# Patient Record
Sex: FEMALE | Race: WHITE | NOT HISPANIC OR LATINO | Employment: UNEMPLOYED | URBAN - METROPOLITAN AREA
[De-identification: names, ages, dates, MRNs, and addresses within clinical notes are randomized per-mention and may not be internally consistent; named-entity substitution may affect disease eponyms.]

---

## 2023-11-02 PROBLEM — E10.649 HYPOGLYCEMIA UNAWARENESS IN TYPE 1 DIABETES MELLITUS (MULTI): Status: ACTIVE | Noted: 2023-11-02

## 2023-11-02 PROBLEM — N92.6 IRREGULAR PERIODS: Status: ACTIVE | Noted: 2023-11-02

## 2023-11-02 PROBLEM — D49.6: Status: ACTIVE | Noted: 2023-11-02

## 2023-11-02 PROBLEM — N91.0 PRIMARY AMENORRHEA: Status: ACTIVE | Noted: 2023-11-02

## 2023-11-02 PROBLEM — R56.9 SEIZURES (MULTI): Status: ACTIVE | Noted: 2023-11-02

## 2023-11-02 PROBLEM — R45.89 FEELING DOWN: Status: ACTIVE | Noted: 2023-11-02

## 2023-11-02 PROBLEM — G91.1 OBSTRUCTIVE HYDROCEPHALUS (MULTI): Status: ACTIVE | Noted: 2023-11-02

## 2023-11-02 PROBLEM — F43.10 COMPLEX POSTTRAUMATIC STRESS DISORDER: Status: ACTIVE | Noted: 2023-11-02

## 2023-11-02 PROBLEM — G93.9 BRAINSTEM LESION: Status: ACTIVE | Noted: 2023-11-02

## 2023-11-02 PROBLEM — S06.5XAA SUBDURAL HEMATOMA (MULTI): Status: ACTIVE | Noted: 2023-11-02

## 2023-11-02 PROBLEM — Z98.2 PRESENCE OF CEREBROSPINAL FLUID DRAINAGE DEVICE: Status: ACTIVE | Noted: 2023-11-02

## 2023-11-02 PROBLEM — R45.851 SUICIDAL IDEATIONS: Status: ACTIVE | Noted: 2023-11-02

## 2023-11-02 PROBLEM — G93.0 CEREBRAL CYST: Status: ACTIVE | Noted: 2023-11-02

## 2023-11-02 PROBLEM — F33.9 MAJOR DEPRESSIVE DISORDER, RECURRENT (CMS-HCC): Status: ACTIVE | Noted: 2023-11-02

## 2023-11-02 RX ORDER — BLOOD SUGAR DIAGNOSTIC
STRIP MISCELLANEOUS
COMMUNITY
Start: 2021-05-28

## 2023-11-02 RX ORDER — INSULIN LISPRO 100 [IU]/ML
100 INJECTION, SOLUTION INTRAVENOUS; SUBCUTANEOUS
COMMUNITY
Start: 2021-06-21 | End: 2023-11-03 | Stop reason: SDUPTHER

## 2023-11-02 RX ORDER — LAMOTRIGINE 25 MG/1
25 TABLET ORAL 3 TIMES DAILY
COMMUNITY

## 2023-11-02 RX ORDER — LAMOTRIGINE 100 MG/1
1 TABLET ORAL 2 TIMES DAILY
COMMUNITY
Start: 2022-02-01

## 2023-11-02 RX ORDER — ARIPIPRAZOLE 10 MG/1
10 TABLET ORAL
COMMUNITY

## 2023-11-02 RX ORDER — CLONAZEPAM 1 MG/1
1 TABLET, ORALLY DISINTEGRATING ORAL AS NEEDED
COMMUNITY
Start: 2021-04-07

## 2023-11-02 RX ORDER — NORGESTIMATE AND ETHINYL ESTRADIOL 0.25-0.035
1 KIT ORAL DAILY
COMMUNITY
Start: 2022-05-17 | End: 2023-11-03 | Stop reason: WASHOUT

## 2023-11-02 RX ORDER — GLUCAGON 3 MG/1
POWDER NASAL
COMMUNITY
Start: 2020-11-13

## 2023-11-02 RX ORDER — INSULIN GLARGINE 100 [IU]/ML
26 INJECTION, SOLUTION SUBCUTANEOUS
COMMUNITY
Start: 2020-10-15

## 2023-11-03 ENCOUNTER — LAB (OUTPATIENT)
Dept: LAB | Facility: LAB | Age: 18
End: 2023-11-03
Payer: MEDICAID

## 2023-11-03 ENCOUNTER — OFFICE VISIT (OUTPATIENT)
Dept: PEDIATRIC ENDOCRINOLOGY | Facility: CLINIC | Age: 18
End: 2023-11-03
Payer: MEDICAID

## 2023-11-03 ENCOUNTER — NUTRITION (OUTPATIENT)
Dept: PEDIATRIC ENDOCRINOLOGY | Facility: CLINIC | Age: 18
End: 2023-11-03

## 2023-11-03 VITALS
BODY MASS INDEX: 28.3 KG/M2 | HEIGHT: 67 IN | HEART RATE: 92 BPM | SYSTOLIC BLOOD PRESSURE: 131 MMHG | RESPIRATION RATE: 17 BRPM | DIASTOLIC BLOOD PRESSURE: 88 MMHG | WEIGHT: 180.34 LBS

## 2023-11-03 DIAGNOSIS — E10.9 TYPE 1 DIABETES, HBA1C GOAL < 7% (MULTI): ICD-10-CM

## 2023-11-03 DIAGNOSIS — E10.9 TYPE 1 DIABETES, HBA1C GOAL < 7% (MULTI): Primary | ICD-10-CM

## 2023-11-03 LAB — HCG UR QL IA.RAPID: NEGATIVE

## 2023-11-03 PROCEDURE — 99215 OFFICE O/P EST HI 40 MIN: CPT | Performed by: PEDIATRICS

## 2023-11-03 PROCEDURE — 3079F DIAST BP 80-89 MM HG: CPT | Performed by: PEDIATRICS

## 2023-11-03 PROCEDURE — 95251 CONT GLUC MNTR ANALYSIS I&R: CPT | Performed by: PEDIATRICS

## 2023-11-03 PROCEDURE — 3075F SYST BP GE 130 - 139MM HG: CPT | Performed by: PEDIATRICS

## 2023-11-03 PROCEDURE — 81025 URINE PREGNANCY TEST: CPT

## 2023-11-03 RX ORDER — INSULIN LISPRO 100 [IU]/ML
INJECTION, SOLUTION INTRAVENOUS; SUBCUTANEOUS
Qty: 30 ML | Refills: 11 | Status: SHIPPED | OUTPATIENT
Start: 2023-11-03

## 2023-11-03 RX ORDER — NORGESTIMATE AND ETHINYL ESTRADIOL 7DAYSX3 28
1 KIT ORAL DAILY
Qty: 28 TABLET | Refills: 12 | Status: SHIPPED | OUTPATIENT
Start: 2023-11-03 | End: 2024-02-09 | Stop reason: SDUPTHER

## 2023-11-03 ASSESSMENT — PAIN SCALES - GENERAL: PAINLEVEL: 0-NO PAIN

## 2023-11-03 NOTE — PROGRESS NOTES
"Reason for Nutrition Visit:  Pt is a 18 y.o. female being seen for T1DM.     Past Medical Hx:  Patient Active Problem List   Diagnosis    Brainstem lesion    Brainstem neoplasm (CMS/HCC)    Cerebral cyst    Complex posttraumatic stress disorder    Feeling down    Hypoglycemia unawareness in type 1 diabetes mellitus (CMS/HCC)    Irregular periods    Major depressive disorder, recurrent (CMS/HCC)    Obstructive hydrocephalus (CMS/HCC)    Presence of cerebrospinal fluid drainage device    Primary amenorrhea    Seizures (CMS/HCC)    Subdural hematoma (CMS/HCC)    Suicidal ideations        Anthropometrics:         11/3/2023    12:59 PM   Vitals   Systolic 131   Diastolic 88   Heart Rate 92   Resp 17   Height (in) 1.706 m (5' 7.17\")   Weight (lb) 180.34   BMI 28.11 kg/m2   BSA (m2) 1.97 m2      Lab Results   Component Value Date    HGBA1C 10.1 (A) 05/13/2022    CHOL 152 05/13/2022      Results for orders placed or performed in visit on 05/13/22   Human Chorionic Gonadotropin   Result Value Ref Range    hCG Quantitative <3 IU/L   Comprehensive Metabolic Panel   Result Value Ref Range    Glucose 384 (H) 74 - 99 mg/dL    Sodium 136 136 - 145 mmol/L    Potassium 4.4 3.5 - 5.3 mmol/L    Chloride 100 98 - 107 mmol/L    Bicarbonate 26 21 - 32 mmol/L    Anion Gap 14 10 - 20 mmol/L    Urea Nitrogen 10 6 - 23 mg/dL    Creatinine 0.67 0.50 - 1.05 mg/dL    GFR Female >90 >90 mL/min/1.73m2    Calcium 9.7 8.6 - 10.6 mg/dL    Albumin 4.4 3.4 - 5.0 g/dL    Alkaline Phosphatase 126 (H) 33 - 110 U/L    Total Protein 6.9 6.4 - 8.2 g/dL    AST 7 (L) 9 - 39 U/L    Total Bilirubin 0.3 0.0 - 1.2 mg/dL    ALT (SGPT) 9 7 - 45 U/L   Tissue Transglutaminase IgA   Result Value Ref Range    Tissue Transglutaminase, IgA <1 0 - 14 U/mL   Thyroid Stimulating Hormone   Result Value Ref Range    TSH 0.62 0.44 - 3.98 mIU/L   Albumin , Urine Random   Result Value Ref Range    ALBUMIN (MG/L) IN URINE 10.1 Not Established mg/L    Albumin/Creatine Ratio 38.8 " (H) 0.0 - 30.0 ug/mg crt    Creatinine, Urine 26.0 20.0 - 320.0 mg/dL   Lipid Panel Non-Fasting   Result Value Ref Range    Cholesterol 152 0 - 199 mg/dL    HDL 54.0 mg/dL    Cholesterol/HDL Ratio 2.8     Non-HDL Cholesterol 98 0 - 119 mg/dL   Hemoglobin A1C   Result Value Ref Range    Hemoglobin A1C 10.1 (A) %    Estimated Average Glucose 243 MG/DL   Thyroxine, Free   Result Value Ref Range    Free T4 1.09 0.78 - 1.48 ng/dL       Insulin Instructions  Omnipod 5   insulin lispro 100 unit/mL injection (HumaLOG)   Last edited by Shwetha Escalante RN on 11/2/2023 at 10:22 AM      Active insulin time = 2 hours      Basal Rate   Total Basal Dose: 36.8 units/day   Time units/hr   12:00 AM 1.7    8:00 AM 1.45    9:00 PM 1.45      Blood Glucose Target   Time mg/dL   12:00  - 120    8:00  - 120    9:00  - 120      Sensitivity Factor   Time mg/dL/unit   12:00 AM 25      Carb Ratio   Time g/unit   12:00 AM 4       Medications:   Current Outpatient Medications on File Prior to Visit   Medication Sig Dispense Refill    acetone, urine, test (TRUEplus Ketone) strip test urine for ketones if blood sugar >250, with illness, or if insulin dose missed      ARIPiprazole (Abilify) 10 mg tablet Take 1 tablet (10 mg) by mouth.      clonazePAM (KlonoPIN) 1 mg disintegrating tablet Take 1 tablet (1 mg) by mouth if needed for seizures.  TAKE 1 TABLET Other PRN After seizure > than 5 minutes.      glucagon (Baqsimi) 3 mg/actuation spray,non-aerosol Administer into affected nostril(s).      insulin glargine (Lantus Solostar U-100 Insulin) 100 unit/mL (3 mL) pen Inject 26 Units under the skin.  inject 26 units subcutaneoulsy daily as needed for pump failure      insulin lispro (HumaLOG U-100 Insulin) 100 unit/mL injection 100 units daily  per insulin pump 30 mL 11    lamoTRIgine (LaMICtal) 100 mg tablet Take 1 tablet (100 mg) by mouth 2 times a day.      lamoTRIgine (LaMICtal) 25 mg tablet Take 1 tablet (25 mg) by mouth 3  times a day.      norgestimate-ethinyl estradioL (Sprintec, 28,) 0.25-35 mg-mcg tablet Take 1 tablet by mouth once daily.      [DISCONTINUED] insulin lispro (HumaLOG U-100 Insulin) 100 unit/mL injection Inject 1 mL (100 Units) under the skin.       No current facility-administered medications on file prior to visit.      24 Diet Recall:  Meal 2: 1 - ice coffee (74) // sausage pepper + onion + rice (1/2 c)(42)// cheeseburger (25) + water + diet Coke   Snacks:  meat + cheese OR SF Cool Whip frozen (40)    Snacks: puffcorn OR Ramen noodles   Loves fruits and vegetables // likes salad   Hanging out at the house // eats what mom cooks// Cherrie cooks   Stairs up and down in the house     Estimated Energy Needs:  9772-6619 calories per day     Nutrition Diagnosis:    Diagnosis Statement 1:  Diagnosis Status: Ongoing  Diagnosis : Food and nutrition related knowledge deficit related to lacks motivation and/or readiness to apply or support systems change as evidenced by erratic foods and     Nutrition Goals:  Try to eat 3 times per day.  Encouraged eating more fruits and vegetables.  Cover all CHO with insulin.  Will follow.

## 2023-11-03 NOTE — PROGRESS NOTES
"Subjective   Cherrie Moses is a 18 y.o. female with type 1 diabetes.        HPI     Goals         in target a1c (pt-stated)       Pre meal bolus  Try to limit carbs              Concerns at this visit:  denies concerns today, feels glucoses are running high.    Social: states no drugs > 1 year    Screens:  Eye exam: 2022  Labs: 5/2022 due  Flu shot: received 11/3/2023    Insulin Injections/Pump sites:  - Gives mealtime insulin during eating  - Site rotation: lower back, thinks she should rotate more        Other:  Hypoglyemia:  - uses  candy, nerds to treat lows  - treats with  15-20 gms carbs  - Nocturnal hypoglycemia? none  Checks ketones with:    Exercise: minimal        Date of Diabetes Diagnosis: 05/01/16  CGM Type: Dexcom G6  Time in range 70-180mg/dL (%): 57  Time low <70mg/dL (%): 1  ED/Hospitalizations related to Diabetes: No  ED/Hospitalization not related to Diabetes: No  ED/Hospitalization related to DKA: No  Severe Hypoglycemia (coma, seizure, disorientation, or the need for high dose glucagon) since last visit: NoInsulin Instructions  Omnipod 5   insulin lispro 100 unit/mL injection (HumaLOG)   Last edited by Shwetha Escalante RN on 11/2/2023 at 10:22 AM      Active insulin time = 2 hours      Basal Rate   Total Basal Dose: 36.8 units/day   Time units/hr   12:00 AM 1.7    8:00 AM 1.45    9:00 PM 1.45      Blood Glucose Target   Time mg/dL   12:00  - 120    8:00  - 120    9:00  - 120      Sensitivity Factor   Time mg/dL/unit   12:00 AM 25      Carb Ratio   Time g/unit   12:00 AM 4             Review of Systems  Regular periods, interested in birth control    Objective   /88   Pulse 92   Resp 17   Ht 1.706 m (5' 7.17\")   Wt 81.8 kg (180 lb 5.4 oz)   BMI 28.11 kg/m²      Lab  Hemoglobin A1C   Date Value Ref Range Status   05/13/2022 10.1 (A) % Final     Comment:          Diagnosis of Diabetes-Adults   Non-Diabetic: < or = 5.6%   Increased risk for developing diabetes: " 5.7-6.4%   Diagnostic of diabetes: > or = 6.5%  .       Monitoring of Diabetes                Age (y)     Therapeutic Goal (%)   Adults:          >18           <7.0   Pediatrics:    13-18           <7.5                   7-12           <8.0                   0- 6            7.5-8.5   American Diabetes Association. Diabetes Care 33(S1), Jan 2010.         Physical Exam   General: interactive in NAD  Injection/pump/sensor sites: no hypertrophies, no bruising or signs of infection or allergic reaction  Fundi:   Neck: No lymphadenopathy  Heart: no edema or cyanosis  Chest/Lungs: unlabored breathing   Abdomen: Soft, non-tender  Neuro: Grossly Intact  Extremities: normal  Feet: normal   Thyroid: normal       Enlargement: not enlarged       Consistency: soft       Surface: smooth  Sexual Development: mature      Assessment/Plan   A 18 y.o. female with T1 Diabetes since 2016 treated  currently with Omnipod 5. She also has a shitory of hydrocephalus s/p  shunt and major behavioral concern and prescribed antipsychotics.  A1C is above target and has trended down since last.   Challenges include: weight gain with antipsychotics with resulting insulin resistance, sedentary lifestyle.  BP is normal.   Insulin pump / sensor/ meter reports were reviewed for patterns and no insulin dose adjustments were made (see insulin instructions).     Patient is due for annual surveillance tests which were ordered.  Patient needs to schedule an eye exam.    Topics reviewed:  - importance of prebolusing   - benefits of physical activity using activity/exercise mode   - family support and collaboration  - family planning -> prescribed sprintec (no fam Hx of 1st degree breast cancer or migraines with aura)    Follow up in 3 mos         Problem List Items Addressed This Visit    None  Visit Diagnoses       Type 1 diabetes, HbA1c goal < 7% (CMS/HCC)    -  Primary    Relevant Medications    insulin lispro (HumaLOG U-100 Insulin) 100 unit/mL  injection    norgestimate-ethinyl estradioL (Ortho Tri-Cyclen,Trinessa) 0.18/0.215/0.25 mg-35 mcg (28) tablet    Other Relevant Orders    POCT glycosylated hemoglobin (Hb A1C) manually resulted    Lipid Panel    Albumin , Urine Random    TSH with reflex to Free T4 if abnormal    Comprehensive Metabolic Panel    hCG, Urine, Qualitative            Plan  No setting changes, but work on pre meal bolusing and bolusing for all carbs  Annual labs ordered-- have them done fasting:  only water for 10 hours before blood draw.  Make an eye appointment     Insulin Instructions  Omnipod 5   insulin lispro 100 unit/mL injection (HumaLOG)   Last edited by Shwetha Escalante RN on 11/2/2023 at 10:22 AM      Active insulin time = 2 hours      Basal Rate   Total Basal Dose: 36.8 units/day   Time units/hr   12:00 AM 1.7    8:00 AM 1.45    9:00 PM 1.45      Blood Glucose Target   Time mg/dL   12:00  - 120    8:00  - 120    9:00  - 120      Sensitivity Factor   Time mg/dL/unit   12:00 AM 25      Carb Ratio   Time g/unit   12:00 AM 4       CGM Interpretation  CGM report was reviewed with family, download scanned into EMR see above for statistics. There is pattern of postprandial hyperglycemia if a bolus is missed      CGM Plan  - no changes, discussed a need to bolus for all meals and snacks

## 2023-11-03 NOTE — PATIENT INSTRUCTIONS
It was good to see you today!  No setting changes, but work on pre meal bolusing and bolusing for all carbs  Annual labs ordered-- have them done fasting:  only water for 10 hours before blood draw.  Make an eye appointment

## 2023-11-10 ENCOUNTER — APPOINTMENT (OUTPATIENT)
Dept: PEDIATRIC ENDOCRINOLOGY | Facility: CLINIC | Age: 18
End: 2023-11-10

## 2024-02-09 ENCOUNTER — OFFICE VISIT (OUTPATIENT)
Dept: PEDIATRIC ENDOCRINOLOGY | Facility: CLINIC | Age: 19
End: 2024-02-09
Payer: MEDICAID

## 2024-02-09 ENCOUNTER — LAB (OUTPATIENT)
Dept: LAB | Facility: LAB | Age: 19
End: 2024-02-09
Payer: COMMERCIAL

## 2024-02-09 VITALS
DIASTOLIC BLOOD PRESSURE: 84 MMHG | SYSTOLIC BLOOD PRESSURE: 134 MMHG | HEIGHT: 72 IN | HEART RATE: 96 BPM | BODY MASS INDEX: 24.25 KG/M2 | OXYGEN SATURATION: 97 % | WEIGHT: 179.01 LBS

## 2024-02-09 DIAGNOSIS — E10.9 TYPE 1 DIABETES MELLITUS WITHOUT COMPLICATION (MULTI): Primary | ICD-10-CM

## 2024-02-09 DIAGNOSIS — E10.9 TYPE 1 DIABETES, HBA1C GOAL < 7% (MULTI): ICD-10-CM

## 2024-02-09 DIAGNOSIS — E10.9 TYPE 1 DIABETES MELLITUS WITHOUT COMPLICATION (MULTI): ICD-10-CM

## 2024-02-09 DIAGNOSIS — Z78.9 USES BIRTH CONTROL: ICD-10-CM

## 2024-02-09 LAB — POC HEMOGLOBIN A1C: 9.7 % (ref 4.2–6.5)

## 2024-02-09 PROCEDURE — 3079F DIAST BP 80-89 MM HG: CPT | Performed by: PEDIATRICS

## 2024-02-09 PROCEDURE — 83036 HEMOGLOBIN GLYCOSYLATED A1C: CPT | Mod: MUE | Performed by: PEDIATRICS

## 2024-02-09 PROCEDURE — 95251 CONT GLUC MNTR ANALYSIS I&R: CPT | Performed by: PEDIATRICS

## 2024-02-09 PROCEDURE — 99214 OFFICE O/P EST MOD 30 MIN: CPT | Performed by: PEDIATRICS

## 2024-02-09 PROCEDURE — 3075F SYST BP GE 130 - 139MM HG: CPT | Performed by: PEDIATRICS

## 2024-02-09 RX ORDER — NORGESTIMATE AND ETHINYL ESTRADIOL 0.25-0.035
1 KIT ORAL DAILY
Qty: 28 TABLET | Refills: 12 | Status: SHIPPED | OUTPATIENT
Start: 2024-02-09 | End: 2025-02-08

## 2024-02-09 ASSESSMENT — PATIENT HEALTH QUESTIONNAIRE - PHQ9: 2. FEELING DOWN, DEPRESSED OR HOPELESS: NOT AT ALL

## 2024-02-09 ASSESSMENT — ENCOUNTER SYMPTOMS
OCCASIONAL FEELINGS OF UNSTEADINESS: 0
DEPRESSION: 0
LOSS OF SENSATION IN FEET: 0

## 2024-02-09 ASSESSMENT — PAIN SCALES - GENERAL: PAINLEVEL: 0-NO PAIN

## 2024-02-09 NOTE — PATIENT INSTRUCTIONS
It was good to see you today! You HBA1C is 9.7% today.      No setting changes, but work on pre meal bolusing and bolusing for all carbs  Annual labs ordered  Make an eye appointment.    Contact Information for Pediatric Endocrinology:   Daytime Number: [phone]  Night/Weekend (emergencies): [phone]  Email: [email or web address]    Please do not send my-chart messages for urgent issues

## 2024-02-09 NOTE — PROGRESS NOTES
Subjective   Cherrie Moses is a 18 y.o. female with type 1 diabetes, here for follow-up with boyfriend [place].     Last visit: 11/3/2023  Complications: no   Comorbidities: hydrocephalus (after shunt)     DIABETES Hx:    Date of Diabetes Diagnosis: 05/01/16  CGM Type: Dexcom G6  Time in range 70-180mg/dL (%): 53  Time low <70mg/dL (%): 0  ED/Hospitalizations related to Diabetes: No  ED/Hospitalization not related to Diabetes: No  ED/Hospitalization related to DKA: No  Severe Hypoglycemia (coma, seizure, disorientation, or the need for high dose glucagon) since last visit: NO    INTERVAL Hx:     She is doing well. There is no problem with her pump or refill. There was a time when the CGM can not be connected. But she had it sorted it out 2 days later.   She is not in school now. She is waiting for the approval for [employer] program.   Her daily schedule as below:  Sleep around 1:30 am   Wake up around 10-11am   Dinner around 5:50 -6:00 pm  Snacking around 9-11:00 pm  Bolus when snacking  Denied Symptom of polyuria or polydipsia. Denied symptom of heat/cold intolerance, palpitation, or excessive sweating. Denied Diarrhea or constipation. Denied excessive tiredness, edema,dry skin or hair fall.  Denied any headache. She is sexually active, taking birth control pill now, said she doesn't like it because she is spotting, she does not want to be pregnant, agreed to try a different OCP.       Her HBA1C IS 9.7% today (Last HBA1C is 8.1 in Nov 2023)  SCREENS/LABS:May/2022 (she did not do the annual tests asked for in last visit)  Eye exam:  can not remember     CURRENT DIABETES REGIMEN:  Insulin Instructions  Omnipod 5   insulin lispro 100 unit/mL injection (HumaLOG)   Last edited by Shwetha Escalante RN on 11/2/2023 at 10:22 AM      Active insulin time = 2 hours      Basal Rate   Total Basal Dose: 36.8 units/day   Time units/hr   12:00 AM 1.7    8:00 AM 1.45    9:00 PM 1.45      Blood Glucose Target   Time mg/dL   12:00 AM  "110 - 120    8:00  - 120    9:00  - 120      Sensitivity Factor   Time mg/dL/unit   12:00 AM 25      Carb Ratio   Time g/unit   12:00 AM 4     Timing of boluses: 3.1  Sites: back wrist and thighs    Average glucose: 185 mg/dl  Time in range 70-180mg/dL (%): 53  Hypoglycemia awareness: no  ROS otherwise negative    Objective   /84   Pulse 96   Ht 1.896 m (6' 2.65\")   Wt 81.2 kg (179 lb 0.2 oz)   SpO2 97%   BMI 22.59 kg/m²          Hemoglobin A1C   Date Value Ref Range Status   05/13/2022 10.1 (A) % Final   03/16/2022 14.1 (A) % Final   11/01/2020 8.5 % Final     Lab Results   Component Value Date    MICROALBCREA 38.8 (H) 05/13/2022    MICROALBCREA 146.6 (H) 01/14/2022    CREATININE 0.67 05/13/2022    CREATININE 0.52 03/17/2022    CREATININE 0.44 (L) 03/16/2022    TSH 0.62 05/13/2022    TSH 1.37 11/08/2021    TSH 0.83 09/28/2020    TTGA <1 05/13/2022       Physical Exam:  alert and conversant, in no acute distress  sclera anicteric, no lid lag, no proptosis  mmm  thyroid normal no goiter  normal work of breathing  normal mood/affect  No acanthosis;  No lipohypertrophy    Assessment/Plan   Diagnoses and all orders for this visit:  Type 1 diabetes mellitus without complication (CMS/[facility])  Hydrocephalus    A 18 y.o. female with T1 Diabetes since 2016 treated currently with Omnipod 5. She also has a history of hydrocephalus s/p  shunt and major behavioral concern and prescribed antipsychotics.  A1C elevated from 8.1% to 9.7% since last visit.   Patient is due for annual surveillance tests which were ordered.  Patient needs to schedule an eye exam.    CGM interpret    Blood sugar is above her target range some of the time over night, with high reading at night before sleep after food. patient said it was due to late bedtime food. Talked to the patient and she admitted that she normally ate take away 2 times a weeks and may under bolus the carb at times. Recommend her to give extra bolus for oily " take away food.               Plan:  1, Do the annual lab.  -     Albumin , Urine Random;   -     Comprehensive Metabolic Panel;   -     Lipid Panel Non-Fasting;   -     TSH with reflex to Free T4 if abnormal;   2, Ophthalmologist appointment to check eye.  3. Changed OCP from ortho tri cyclen to ortho cyclen  3, Continue the same setting of the pump as below:  4, Follow up in 3 months.      Insulin Instructions  Omnipod 5   insulin lispro 100 unit/mL injection (HumaLOG)   Last edited by Shwetha Escalante RN on 11/2/2023 at 10:22 AM      Active insulin time = 2 hours      Basal Rate   Total Basal Dose: 36.8 units/day   Time units/hr   12:00 AM 1.7    8:00 AM 1.45    9:00 PM 1.45      Blood Glucose Target   Time mg/dL   12:00  - 120    8:00  - 120    9:00  - 120      Sensitivity Factor   Time mg/dL/unit   12:00 AM 25      Carb Ratio   Time g/unit   12:00 AM 4       Patient was seen, re-examined and discussed with attending Dr. Soco ARRIAGA MD.  Pediatric Endocrinology Fellow

## 2024-06-28 ENCOUNTER — NUTRITION (OUTPATIENT)
Dept: PEDIATRIC ENDOCRINOLOGY | Facility: CLINIC | Age: 19
End: 2024-06-28

## 2024-06-28 ENCOUNTER — APPOINTMENT (OUTPATIENT)
Dept: PEDIATRIC ENDOCRINOLOGY | Facility: CLINIC | Age: 19
End: 2024-06-28
Payer: COMMERCIAL

## 2024-06-28 VITALS
HEIGHT: 67 IN | HEART RATE: 101 BPM | SYSTOLIC BLOOD PRESSURE: 130 MMHG | WEIGHT: 175.93 LBS | DIASTOLIC BLOOD PRESSURE: 78 MMHG | BODY MASS INDEX: 27.61 KG/M2

## 2024-06-28 DIAGNOSIS — E10.9 TYPE 1 DIABETES, HBA1C GOAL < 7% (MULTI): ICD-10-CM

## 2024-06-28 LAB — POC HEMOGLOBIN A1C: 7.6 % (ref 4.2–6.5)

## 2024-06-28 PROCEDURE — 83036 HEMOGLOBIN GLYCOSYLATED A1C: CPT | Mod: QW | Performed by: PEDIATRICS

## 2024-06-28 PROCEDURE — 95251 CONT GLUC MNTR ANALYSIS I&R: CPT | Performed by: PEDIATRICS

## 2024-06-28 PROCEDURE — 99215 OFFICE O/P EST HI 40 MIN: CPT | Performed by: PEDIATRICS

## 2024-06-28 PROCEDURE — 3075F SYST BP GE 130 - 139MM HG: CPT | Performed by: PEDIATRICS

## 2024-06-28 PROCEDURE — 3078F DIAST BP <80 MM HG: CPT | Performed by: PEDIATRICS

## 2024-06-28 RX ORDER — GLUCAGON 3 MG/1
POWDER NASAL
Qty: 2 EACH | Refills: 2 | Status: SHIPPED | OUTPATIENT
Start: 2024-06-28

## 2024-06-28 RX ORDER — INSULIN LISPRO 100 [IU]/ML
INJECTION, SOLUTION INTRAVENOUS; SUBCUTANEOUS
Qty: 30 ML | Refills: 11 | Status: SHIPPED | OUTPATIENT
Start: 2024-06-28

## 2024-06-28 RX ORDER — BLOOD-GLUCOSE TRANSMITTER
EACH MISCELLANEOUS
Qty: 1 EACH | Refills: 3 | Status: SHIPPED | OUTPATIENT
Start: 2024-06-28

## 2024-06-28 RX ORDER — INSULIN PMP CART,AUT,G6/7,CNTR
1 EACH SUBCUTANEOUS EVERY OTHER DAY
Qty: 15 EACH | Refills: 11 | Status: SHIPPED | OUTPATIENT
Start: 2024-06-28 | End: 2025-06-28

## 2024-06-28 RX ORDER — BLOOD-GLUCOSE SENSOR
EACH MISCELLANEOUS
Qty: 3 EACH | Refills: 11 | Status: SHIPPED | OUTPATIENT
Start: 2024-06-28

## 2024-06-28 ASSESSMENT — PAIN SCALES - GENERAL: PAINLEVEL: 0-NO PAIN

## 2024-06-28 NOTE — PROGRESS NOTES
"Reason for Nutrition Visit:  Pt is a 19 y.o. female being seen for T1DM     Past Medical Hx:  Patient Active Problem List   Diagnosis    Brainstem lesion    Brainstem neoplasm (Multi)    Cerebral cyst    Complex posttraumatic stress disorder    Feeling down    Hypoglycemia unawareness in type 1 diabetes mellitus (Multi)    Irregular periods    Major depressive disorder, recurrent (CMS-HCC)    Obstructive hydrocephalus (Multi)    Presence of cerebrospinal fluid drainage device    Primary amenorrhea    Seizures (Multi)    Subdural hematoma (Multi)    Suicidal ideations      Anthropometrics:         6/28/2024    12:53 PM   Vitals   Systolic 130   Diastolic 78   Heart Rate 101   Height (in) 1.709 m (5' 7.28\")   Weight (lb) 175.93   BMI 27.32 kg/m2   BSA (m2) 1.95 m2   Visit Report Report      Weight change:  loss of 1.2 kg     Lab Results   Component Value Date    HGBA1C 9.7 (A) 02/09/2024    CHOL 152 05/13/2022      Results for orders placed or performed in visit on 02/09/24   POCT glycosylated hemoglobin (Hb A1C) manually resulted   Result Value Ref Range    POC HEMOGLOBIN A1c 9.7 (A) 4.2 - 6.5 %     Insulin Instructions  Omnipod 5   insulin lispro 100 unit/mL injection (HumaLOG)   Last edited by Mabel Wan DO on 6/28/2024 at 12:59 PM      Active insulin time = 2 hours      Basal Rate   Total Basal Dose: 45.6 units/day   Time units/hr   12:00 AM 1.9    8:00 AM 1.9    9:00 PM 1.9      Blood Glucose Target   Time mg/dL   12:00  - 120    8:00  - 120    9:00  - 120      Sensitivity Factor   Time mg/dL/unit   12:00 AM 30      Carb Ratio   Time g/unit   12:00 AM 4     Medications:   Current Outpatient Medications on File Prior to Visit   Medication Sig Dispense Refill    acetone, urine, test (TRUEplus Ketone) strip test urine for ketones if blood sugar >250, with illness, or if insulin dose missed      ARIPiprazole (Abilify) 10 mg tablet Take 1 tablet (10 mg) by mouth.      clonazePAM (KlonoPIN) 1 " mg disintegrating tablet Take 1 tablet (1 mg) by mouth if needed for seizures.  TAKE 1 TABLET Other PRN After seizure > than 5 minutes.      glucagon (Baqsimi) 3 mg/actuation spray,non-aerosol Administer into affected nostril(s).      insulin glargine (Lantus Solostar U-100 Insulin) 100 unit/mL (3 mL) pen Inject 26 Units under the skin.  inject 26 units subcutaneoulsy daily as needed for pump failure      insulin lispro (HumaLOG U-100 Insulin) 100 unit/mL injection 100 units daily  per insulin pump 30 mL 11    lamoTRIgine (LaMICtal) 100 mg tablet Take 1 tablet (100 mg) by mouth 2 times a day.      lamoTRIgine (LaMICtal) 25 mg tablet Take 1 tablet (25 mg) by mouth 3 times a day.      norgestimate-ethinyl estradioL (Ortho-Cyclen) 0.25-35 mg-mcg tablet Take 1 tablet by mouth once daily. 28 tablet 12     No current facility-administered medications on file prior to visit.      24 Diet Recall:  Meal 1:  B - sleep thru   Snack: coffee - [place] (65) or snack - meat + cheese + vegetables // banana or yogurt    Meal 2:  L - chips - snacks (15-18) + Diet Coke + diet water + Monster SF (often - daily) - caffeine in may be increasing BS   Meal 3:  D -  () - [place] - rice + chicken + vegetables + cheese (80) OR pork chop + mac + cheese + green beans  (45) + diet Coke  Snacks:  ice cream or Little Liana or Oatmeal Creme pie (25)   Chicken Edward + bread + [place] Ice tea   Lows 59-60   Used the Basquime - 1 month ago   CHO counting   Interested in being veternarian     Activity: not a lot     No Known Allergies    Estimated Energy Needs: 0876-0693 calories/day     Nutrition Diagnosis:    Diagnosis Statement 1:  Diagnosis Status: Ongoing  Diagnosis : Food and nutrition related knowledge deficit related to  healthy diet with diabetes  as evidenced by diet history     Nutrition Goals:  Encouraged patient to bolus for food consistently.  Great job eating better foods at breakfast and dinner.    Keep eating less  processed foods more often.

## 2024-06-28 NOTE — PROGRESS NOTES
"Subjective   Cherrie Moses is a 19 y.o. female with type 1 diabetes.   Today Cherrie presents to clinic with her mother.     HPI  Other Medical History:     Used baqsimi about a month ago.   Dexcom was reading 180, but she was pale and had blurry vision  Felt hungry, blurry vision, felt like she would faint; symptoms happened all of a sudden    Old controller broke, reset new controller, was not sure about doses - entered basal rate of 1.9 units/hour rather than 1.45    Sees Tia Lanza for psych and Cecy Calderarosamaria for therapy    Changed to different OCP last time, still had heavy bleeding, stopped OCP  Periods are regular.   Not on any birth control.     Has a new boyfriend, is sexually active.        Manages diabetes with Omnipod 5     -TDD: 50  -Total daily basal: 39  -Basal %: 78  -BG average: 158   -CGM wear time (%): 84  -Daily carb average: 67g    Current doses:   Insulin Instructions  Omnipod 5   insulin lispro 100 unit/mL injection (HumaLOG)   Last edited by Mabel Wan DO on 6/28/2024 at 12:59 PM      Active insulin time = 2 hours      Basal Rate   Total Basal Dose: 45.6 units/day   Time units/hr   12:00 AM 1.9    8:00 AM 1.9    9:00 PM 1.9      Blood Glucose Target   Time mg/dL   12:00  - 120    8:00  - 120    9:00  - 120      Sensitivity Factor   Time mg/dL/unit   12:00 AM 30      Carb Ratio   Time g/unit   12:00 AM 4         Concerns at this visit:      Social:      Screens:  Eye exam: due  Labs: due in the fall  Depression screen: followed by psych        Goals         in target a1c (pt-stated)       Pre meal bolus  Try to limit carbs                        Review of Systems   All other systems reviewed and are negative.      Objective   /78 (BP Location: Right arm, Patient Position: Sitting, BP Cuff Size: Adult)   Pulse 101   Ht 1.709 m (5' 7.28\")   Wt 79.8 kg (175 lb 14.8 oz)   BMI 27.32 kg/m²      Physical Exam     Lab  Lab Results   Component Value Date    " HGBA1C 7.6 (A) 06/28/2024    HGBA1C 9.7 (A) 02/09/2024    HGBA1C 10.1 (A) 05/13/2022    HGBA1C 14.1 (A) 03/16/2022       Assessment/Plan   Cherrie Moses is a 19 y.o. female T1 Diabetes since 2016 treated currently with Omnipod 5. HbA1c today improved to 7.6%, but at the expense of too much hypoglycemia. She also has a history of hydrocephalus s/p  shunt and major behavioral concern and prescribed antipsychotics. She is currently managed with omnipod 5. Discussed upgrading to G7 at the end of July.     We discussed importance of birth control, advised to avoid sexual activity until on birth control, I put in a referral to gyn.     Due for annual labs at next visit.       Plan:    Diagnoses and all orders for this visit:  Type 1 diabetes, HbA1c goal < 7% (Multi)  -     POCT glycosylated hemoglobin (Hb A1C) manually resulted  -     Referral to Gynecology; Future  -     glucagon (Baqsimi) 3 mg/actuation spray,non-aerosol; Spray into nose as directed for severe low blood sugar  -     Dexcom G6 Sensor device; Use to monitor glucose, change every 10 days  -     Dexcom G6 Transmitter device; Use as instructed  -     insulin lispro (HumaLOG U-100 Insulin) 100 unit/mL injection; 100 units daily  per insulin pump  -     insulin pump cart,automated,BT (Omnipod 5 G6 Pods, Gen 5,) cartridge; Inject 1 Device under the skin every other day.       Insulin Instructions  Omnipod 5   insulin lispro 100 unit/mL injection (HumaLOG)   Last edited by Mabel Wan DO on 6/28/2024 at 12:59 PM      Active insulin time = 2 hours      Basal Rate   Total Basal Dose: 45.6 units/day   Time units/hr   12:00 AM 1.9    8:00 AM 1.9    9:00 PM 1.9      Blood Glucose Target   Time mg/dL   12:00  - 120    8:00  - 120    9:00  - 120      Sensitivity Factor   Time mg/dL/unit   12:00 AM 30      Carb Ratio   Time g/unit   12:00 AM 4       CGM Interpretation/Plan   14 day CGM download was reviewed in detail as documented above  under GLUCOSE MONITORING and will be attached to chart.  A minimum of 72 hours of glucose data was used to inform the management plan outlined above. She is 57% TIR and 11% low. The 11% low is due to being in manual mode and getting much higher basal rate of 1.9 units/hour as Cherrie incorrectly entered the basal rate into her new pump controller. Changed basal rate back to previous setting of 1.45 units/hour.     Mabel Wan, DO

## 2024-10-01 ENCOUNTER — HOSPITAL ENCOUNTER (EMERGENCY)
Facility: HOSPITAL | Age: 19
Discharge: HOME | End: 2024-10-01
Payer: COMMERCIAL

## 2024-10-01 VITALS
HEART RATE: 92 BPM | BODY MASS INDEX: 28.45 KG/M2 | SYSTOLIC BLOOD PRESSURE: 135 MMHG | WEIGHT: 177 LBS | DIASTOLIC BLOOD PRESSURE: 97 MMHG | TEMPERATURE: 96.1 F | HEIGHT: 66 IN | OXYGEN SATURATION: 99 % | RESPIRATION RATE: 16 BRPM

## 2024-10-01 DIAGNOSIS — K12.0 APHTHOUS ULCER OF MOUTH: Primary | ICD-10-CM

## 2024-10-01 LAB — GLUCOSE BLD MANUAL STRIP-MCNC: 218 MG/DL (ref 74–99)

## 2024-10-01 PROCEDURE — 99283 EMERGENCY DEPT VISIT LOW MDM: CPT

## 2024-10-01 PROCEDURE — 2500000001 HC RX 250 WO HCPCS SELF ADMINISTERED DRUGS (ALT 637 FOR MEDICARE OP): Mod: SE

## 2024-10-01 PROCEDURE — 96372 THER/PROPH/DIAG INJ SC/IM: CPT

## 2024-10-01 PROCEDURE — 82947 ASSAY GLUCOSE BLOOD QUANT: CPT

## 2024-10-01 PROCEDURE — 2500000004 HC RX 250 GENERAL PHARMACY W/ HCPCS (ALT 636 FOR OP/ED): Mod: SE

## 2024-10-01 RX ORDER — DEXAMETHASONE 0.5 MG/5ML
1 ELIXIR ORAL DAILY
Qty: 70 ML | Refills: 0 | Status: SHIPPED | OUTPATIENT
Start: 2024-10-01 | End: 2024-10-08

## 2024-10-01 RX ORDER — LIDOCAINE HYDROCHLORIDE 20 MG/ML
1.25 SOLUTION OROPHARYNGEAL ONCE
Status: COMPLETED | OUTPATIENT
Start: 2024-10-01 | End: 2024-10-01

## 2024-10-01 RX ORDER — KETOROLAC TROMETHAMINE 30 MG/ML
30 INJECTION, SOLUTION INTRAMUSCULAR; INTRAVENOUS ONCE
Status: COMPLETED | OUTPATIENT
Start: 2024-10-01 | End: 2024-10-01

## 2024-10-01 RX ORDER — LIDOCAINE HYDROCHLORIDE 20 MG/ML
1.25 SOLUTION OROPHARYNGEAL AS NEEDED
Qty: 100 ML | Refills: 0 | Status: SHIPPED | OUTPATIENT
Start: 2024-10-01 | End: 2024-10-04

## 2024-10-01 ASSESSMENT — COLUMBIA-SUICIDE SEVERITY RATING SCALE - C-SSRS
1. IN THE PAST MONTH, HAVE YOU WISHED YOU WERE DEAD OR WISHED YOU COULD GO TO SLEEP AND NOT WAKE UP?: NO
2. HAVE YOU ACTUALLY HAD ANY THOUGHTS OF KILLING YOURSELF?: NO
6. HAVE YOU EVER DONE ANYTHING, STARTED TO DO ANYTHING, OR PREPARED TO DO ANYTHING TO END YOUR LIFE?: NO

## 2024-10-01 ASSESSMENT — PAIN SCALES - WONG BAKER: WONGBAKER_NUMERICALRESPONSE: HURTS LITTLE BIT

## 2024-10-01 ASSESSMENT — PAIN DESCRIPTION - LOCATION
LOCATION: MOUTH
LOCATION: MOUTH

## 2024-10-01 ASSESSMENT — PAIN SCALES - GENERAL: PAINLEVEL_OUTOF10: 3

## 2024-10-02 NOTE — ED PROVIDER NOTES
HPI   Chief Complaint   Patient presents with    Mouth Lesions     Mouth lesions from braces       Patient is a 19-year-old female presenting to the ED with cc of mouth ulcers last couple days.  Patient states she got pants recently on her braces and noticed these afterwards.  Patient states she has history of sucking her teeth at night she has multiple piercings on her mouth and braces and has had ulcers in the past from this.  Patient has no rashes elsewhere.  Patient denies any fever chills congestion cough chest pain shortness of breath nausea vomiting abdominal pain diarrhea constipation.  Patient denies any bleeding from the areas.                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  Family History   Problem Relation Name Age of Onset    Other (schizoaffective disorder, bipolar type) Father      Other (substance use) Father      Alcohol abuse Maternal Grandfather       Social History     Tobacco Use    Smoking status: Not on file    Smokeless tobacco: Not on file   Substance Use Topics    Alcohol use: Not on file    Drug use: Not on file       Physical Exam   ED Triage Vitals [10/01/24 2100]   Temperature Heart Rate Respirations BP   35.6 °C (96.1 °F) (!) 103 16 (!) 137/91      Pulse Ox Temp Source Heart Rate Source Patient Position   98 % Temporal Monitor --      BP Location FiO2 (%)     -- --       Physical Exam  HENT:      Head: Normocephalic.      Mouth/Throat:      Mouth: Mucous membranes are moist.      Comments: Aphthous ulcerations in the inside of the upper and  lower lips  Cardiovascular:      Pulses: Normal pulses.   Pulmonary:      Effort: Pulmonary effort is normal.   Skin:     Findings: No rash.   Neurological:      Mental Status: She is alert.           ED Course & MDM   Diagnoses as of 10/01/24 2201   Aphthous ulcer of mouth                 No data recorded                                 Medical Decision Making  Medical Decision Making:  Patient presented as described in HPI. Patient case including ROS, PE, and treatment and plan discussed with ED attending if attached as cosigner. Due to patients presentation orders completed include as documented.  Patient presents to the ED with cc of mouth lesions last couple days.  Patient denies any new medications.  Patient has braces and piercings in her mouth and sucks on her teeth at night.  Patient recently got bands placed on her braces and states since then the lesions have worsened.  Patient is nontoxic-appearing, Aphthous ulcerations in the inside of the upper and lower lips, tolerating secretions well no rashes on exam.  Patient given lidocaine viscous and Toradol for symptoms.  Patient discharged home with steroids and lidocaine viscous.  Patient educated any worse symptoms return.  Patient educated follow-up primary doctor and orthodontic doctor.  Patient educated on ibuprofen Tylenol for home.  Patient remained stable and discharged.  Patient was advised to follow up with PCP or recommended provider in 2-3 days for another evaluation and exam. I advised patient/guardian to return or go to closest emergency room immediately if symptoms change, get worse, new symptoms develop prior to follow up. If there is no improvement in symptoms in the next 24 hours they are advised to return for further evaluation and exam. I also explained the plan and treatment course. Patient/guardian is in agreement with plan, treatment course, and follow up and states verbally that they will comply.      Patient care discussed with:  N/A  Social Determinants affecting care: N/A    Final diagnosis and disposition as below.  See CI    Homegoing. I discussed the differential; results and discharge plan with the patient and/or family/friend/caregiver if present.  I emphasized the importance of follow-up with the physician I referred them to in the timeframe recommended.  I explained reasons for the patient to return to the Emergency Department. They agreed that if they feel their condition is worsening or if they have any other concern they should call 911 immediately for further assistance. I gave the patient an opportunity to ask all questions they had and answered all of them accordingly. They understand return precautions and discharge instructions. The patient and/or family/friend/caregiver expressed understanding verbally and that they would comply.       Disposition:  Discharge      This note has been transcribed using voice recognition and may contain grammatical errors, misplaced words, incorrect words, incorrect phrases or other errors.          Procedure  Procedures     Irish Resendiz PA-C  10/01/24 4454

## 2024-10-25 ENCOUNTER — APPOINTMENT (OUTPATIENT)
Dept: PEDIATRIC ENDOCRINOLOGY | Facility: CLINIC | Age: 19
End: 2024-10-25
Payer: COMMERCIAL

## 2025-02-14 ENCOUNTER — TELEMEDICINE (OUTPATIENT)
Dept: PEDIATRIC ENDOCRINOLOGY | Facility: CLINIC | Age: 20
End: 2025-02-14
Payer: COMMERCIAL

## 2025-02-14 DIAGNOSIS — E10.9 TYPE 1 DIABETES, HBA1C GOAL < 7% (MULTI): Primary | ICD-10-CM

## 2025-02-14 NOTE — PROGRESS NOTES
[facility]  Pediatric Diabetes Center    Subjective   Cherrie Moses is a 19 y.o. female with type 1 diabetes presenting for a virtual visit from her home. I was in [facility] in [place], [place].     HPI   Has had a new diagnosis of schizoaffective disorder,m causing her a lot of distress and is trying to adjust.     Other Medical History:   Has recently started seeing bugs in her food and hearing whispering. In addition has had paranoia including thinking people are spying on her from the window or through the outlets. After that was diagnosed with schizoaafective disorder. She has been having a hard time with the medication change, the first medication caused her emotions to fluctuate a lot -- she has had a lot of problems with her family. She was switched to risperidone which cause her a lot of sleepiness and fatigue -- slept 16-18h a day. She had stopped it and still has not gotten the new medication prescribed by psych.   On birth control.    Manages diabetes with omnipod 5 and dexcom     Concerns at this visit:   BG up and down.   She had increased basal rate after noticing multiple high BGs, after which she had multiple lows sp she attributed this to the increase in basal rate and eventually decreased it back.   Has been missing carb boluses.     Social:   Currently living with mom and grandpa     Insulin Injections/Pump sites:   - Gives mealtime insulin after eating.  - Site rotation: yes     Carbohydrate counting:   - Patient states they are fair at counting carbs.  - Patient states they are fair at adherence to bolusing for carbs.     Other:   Hypoglycemia:  - uses boxes of juice to treat lows  - treats with 28 gms carbs  - Nocturnal hypoglycemia: no  Checks ketones with: strips, with hyperglycemia, hasn't needed to check      Exercise: none      Education Reviewed: premeal bolusing, treat hypoglycemia with 15g, automated mode on omnipod and how it adjusts insulin  delivery     Goals         in target a1c (pt-stated)       Pre meal bolus  Try to limit carbs              Diabetes  Date of Diabetes Diagnosis: 05/01/16  Type of Diabetes: Type 1    Insulin Delivery  Diabetes Management Regimen: Pump  Pump Type: Omnipod  Using AID System: Yes  Boluses Per Day (user initiated): 1.2  Total Daily Dose of Insulin (units): 55.1  Total Basal Insulin Per Day (units): 36.8  % Basal: 66.79  % Bolus: 33.21  Total Daily Carbs (grams): 95.8  Percent Automated Mode (%): 100    Glucose Monitoring  How do you primarily monitor blood sugars?: CGM  CGM Type: Dexcom G6  Time in range 70-180mg/dL (%): 170  Time low <70mg/dL (%): 3  Time high >180mg/dL (%): -73  Average Glucose: 59    Clinical Details  Hypoglycemia Unawareness : Yes  Severe Hypoglycemia (coma, seizure, disorientation, or the need for high dose glucagon) since last visit: No    Hospitalizations (since last endocrine appointment)  ED/Hospitalizations related to Diabetes: No  ED/Hospitalization not related to Diabetes: No  ED/Hospitalization related to DKA: No         Screens  Labs:  (due)         Review of Systems otherwise negative    EXAM:   Well appearing, NAD. Multiple piercings on her face.   No increased work of breathing. No visible goiter.       Lab Results   Component Value Date    HGBA1C 7.6 (A) 06/28/2024    HGBA1C 9.7 (A) 02/09/2024    HGBA1C 10.1 (A) 05/13/2022    HGBA1C 14.1 (A) 03/16/2022       Assessment/Plan   Cherrie Moses is a 19 y.o. female with type 1 diabetes since 2016 currently on omnipod 5.   Due for annual labs, we'll get an A1C with labs.     Glucose Monitoring: on review noted to have multiple missed or late carb bolusing followed by low BG.     Plan:    - bolus for 1/2 the carbs if not sure if she will eat the full meal.  - bolus for 1/2 the carbs if she boluses more than half an hour after eating or if BG has already spiked.   - due for annual labs  - f/up in 3 months in person     Insulin  Instructions  Omnipod 5   insulin lispro 100 unit/mL injection   Last edited by Deedee Strong MD on 2/14/2025 at 2:42 PM      Active insulin time = 2 hours      Basal Rate   Total Basal Dose: 36 units/day   Time units/hr   12:00 AM 1.5      Blood Glucose Target   Time mg/dL   12:00  - 120    8:00  - 120    9:00  - 120      Sensitivity Factor   Time mg/dL/unit   12:00 AM 30      Carb Ratio   Time g/unit   12:00 AM 4     CGM Interpretation/Plan  14 day CGM download was reviewed in detail as documented above under GLUCOSE MONITORING and will be attached to chart.  A minimum of 72 hours of glucose data was used to inform the management plan outlined above. She is 59% in range which is good, but has had multiple episodes of hypoglycemia associated with either late or missed carb bolus.  No dose changes.     Deedee Strong MD  Pediatric Endocrinology Fellow, PGY IV

## 2025-04-18 ENCOUNTER — HOSPITAL ENCOUNTER (EMERGENCY)
Facility: HOSPITAL | Age: 20
Discharge: HOME | End: 2025-04-18
Attending: EMERGENCY MEDICINE
Payer: COMMERCIAL

## 2025-04-18 ENCOUNTER — PHARMACY VISIT (OUTPATIENT)
Dept: PHARMACY | Facility: CLINIC | Age: 20
End: 2025-04-18
Payer: MEDICAID

## 2025-04-18 VITALS
OXYGEN SATURATION: 98 % | TEMPERATURE: 97 F | DIASTOLIC BLOOD PRESSURE: 110 MMHG | HEIGHT: 66 IN | BODY MASS INDEX: 28.93 KG/M2 | SYSTOLIC BLOOD PRESSURE: 155 MMHG | WEIGHT: 180 LBS | HEART RATE: 112 BPM | RESPIRATION RATE: 18 BRPM

## 2025-04-18 DIAGNOSIS — K12.1 STOMATITIS: Primary | ICD-10-CM

## 2025-04-18 PROCEDURE — 99283 EMERGENCY DEPT VISIT LOW MDM: CPT | Performed by: EMERGENCY MEDICINE

## 2025-04-18 PROCEDURE — RXMED WILLOW AMBULATORY MEDICATION CHARGE

## 2025-04-18 RX ORDER — LIDOCAINE HYDROCHLORIDE 20 MG/ML
SOLUTION OROPHARYNGEAL
Qty: 20 ML | Refills: 0 | OUTPATIENT
Start: 2025-04-18

## 2025-04-18 RX ORDER — DIPHENHYDRAMINE HYDROCHLORIDE 12.5 MG/5ML
LIQUID ORAL
Qty: 20 ML | Refills: 0 | OUTPATIENT
Start: 2025-04-18

## 2025-04-18 RX ORDER — DOXYCYCLINE 100 MG/1
100 TABLET ORAL 2 TIMES DAILY
Qty: 20 TABLET | Refills: 0 | Status: SHIPPED | OUTPATIENT
Start: 2025-04-18 | End: 2025-04-28

## 2025-04-18 RX ORDER — ALUMINUM HYDROXIDE, MAGNESIUM HYDROXIDE, AND SIMETHICONE 1200; 120; 1200 MG/30ML; MG/30ML; MG/30ML
SUSPENSION ORAL
Qty: 20 ML | Refills: 0 | OUTPATIENT
Start: 2025-04-18

## 2025-04-18 ASSESSMENT — COLUMBIA-SUICIDE SEVERITY RATING SCALE - C-SSRS
1. IN THE PAST MONTH, HAVE YOU WISHED YOU WERE DEAD OR WISHED YOU COULD GO TO SLEEP AND NOT WAKE UP?: NO
6. HAVE YOU EVER DONE ANYTHING, STARTED TO DO ANYTHING, OR PREPARED TO DO ANYTHING TO END YOUR LIFE?: NO
2. HAVE YOU ACTUALLY HAD ANY THOUGHTS OF KILLING YOURSELF?: NO

## 2025-04-18 ASSESSMENT — PAIN DESCRIPTION - LOCATION: LOCATION: MOUTH

## 2025-04-18 ASSESSMENT — PAIN DESCRIPTION - PAIN TYPE: TYPE: ACUTE PAIN

## 2025-04-18 ASSESSMENT — PAIN - FUNCTIONAL ASSESSMENT: PAIN_FUNCTIONAL_ASSESSMENT: 0-10

## 2025-04-18 ASSESSMENT — PAIN SCALES - GENERAL: PAINLEVEL_OUTOF10: 8

## 2025-04-18 NOTE — ED PROVIDER NOTES
HPI   Chief Complaint   Patient presents with    Mouth Lesions       HPI        Patient History   Medical History[1]  Surgical History[2]  Family History[3]  Social History[4]    Physical Exam   ED Triage Vitals [04/18/25 1458]   Temperature Heart Rate Respirations BP   36.1 °C (97 °F) (!) 112 18 (!) 155/110      Pulse Ox Temp Source Heart Rate Source Patient Position   98 % Temporal -- --      BP Location FiO2 (%)     -- --       Physical Exam  Constitutional:       General: She is not in acute distress.     Appearance: Normal appearance. She is not toxic-appearing.   HENT:      Head: Normocephalic and atraumatic.      Right Ear: Tympanic membrane normal.      Left Ear: Tympanic membrane normal.      Mouth/Throat:      Mouth: Mucous membranes are moist. Oral lesions present.      Pharynx: Oropharynx is clear.   Eyes:      Conjunctiva/sclera: Conjunctivae normal.      Pupils: Pupils are equal, round, and reactive to light.   Cardiovascular:      Rate and Rhythm: Normal rate and regular rhythm.      Pulses: Normal pulses.      Heart sounds: Normal heart sounds.   Pulmonary:      Effort: Pulmonary effort is normal. No respiratory distress.      Breath sounds: Normal breath sounds. No wheezing.   Abdominal:      General: Bowel sounds are normal.      Palpations: Abdomen is soft.      Tenderness: There is no abdominal tenderness. There is no guarding or rebound.   Musculoskeletal:         General: Normal range of motion.      Cervical back: Normal range of motion.   Skin:     General: Skin is warm and dry.   Neurological:      General: No focal deficit present.      Mental Status: She is alert and oriented to person, place, and time.           ED Course & MDM   Diagnoses as of 04/18/25 1607   Stomatitis                 No data recorded     [address] Coma Scale Score: 15 (04/18/25 1500 : Maribell Calix RN)                           Medical Decision Making  19-year-old female presents to the ER with chief complaint of  multiple mouth sores patient recently had her braces removed and she has some new hardware.  Will give her swish and swallow.  Patient also has some possible infection on her cheek from a abscess.  Will start her on antibiotics.  Patient discharged home to follow-up with PCP.        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 No pertinent surgical history.  [3]   Family History  Problem Relation Name Age of Onset    Other (schizoaffective disorder, bipolar type) Father      Other (substance use) Father      Alcohol abuse Maternal Grandfather     [4]   Social History  Tobacco Use    Smoking status: Never    Smokeless tobacco: Never   Vaping Use    Vaping status: Every Day   Substance Use Topics    Alcohol use: Not on file    Drug use: Yes     Types: Marijuana        Reymundo Starr DO  04/18/25 5271

## 2025-04-18 NOTE — ED TRIAGE NOTES
Pt ambulatory to ED c/o sores on her face and mouth that began two days ago, pt states she tried draining the wounds on her face last night but they kept coming back after draining them

## 2025-06-27 ENCOUNTER — NUTRITION (OUTPATIENT)
Dept: PEDIATRIC ENDOCRINOLOGY | Facility: CLINIC | Age: 20
End: 2025-06-27

## 2025-06-27 ENCOUNTER — OFFICE VISIT (OUTPATIENT)
Dept: PEDIATRIC ENDOCRINOLOGY | Facility: CLINIC | Age: 20
End: 2025-06-27
Payer: MEDICAID

## 2025-06-27 VITALS
WEIGHT: 174.05 LBS | SYSTOLIC BLOOD PRESSURE: 130 MMHG | HEART RATE: 80 BPM | OXYGEN SATURATION: 100 % | HEIGHT: 68 IN | RESPIRATION RATE: 20 BRPM | BODY MASS INDEX: 26.38 KG/M2 | DIASTOLIC BLOOD PRESSURE: 84 MMHG

## 2025-06-27 DIAGNOSIS — E10.9 TYPE 1 DIABETES, HBA1C GOAL < 7% (MULTI): Primary | ICD-10-CM

## 2025-06-27 LAB — POC HEMOGLOBIN A1C: 9.4 % (ref 4.2–6.5)

## 2025-06-27 PROCEDURE — 99215 OFFICE O/P EST HI 40 MIN: CPT | Mod: 25 | Performed by: PEDIATRICS

## 2025-06-27 PROCEDURE — 83036 HEMOGLOBIN GLYCOSYLATED A1C: CPT | Performed by: PEDIATRICS

## 2025-06-27 RX ORDER — INSULIN LISPRO 100 [IU]/ML
INJECTION, SOLUTION INTRAVENOUS; SUBCUTANEOUS
Qty: 30 ML | Refills: 11 | Status: SHIPPED | OUTPATIENT
Start: 2025-06-27

## 2025-06-27 RX ORDER — GUANFACINE 1 MG/1
1 TABLET, EXTENDED RELEASE ORAL
COMMUNITY
Start: 2025-06-15

## 2025-06-27 RX ORDER — BLOOD-GLUCOSE SENSOR
EACH MISCELLANEOUS
Qty: 3 EACH | Refills: 11 | Status: SHIPPED | OUTPATIENT
Start: 2025-06-27

## 2025-06-27 RX ORDER — LURASIDONE HYDROCHLORIDE 60 MG/1
TABLET, FILM COATED ORAL
COMMUNITY
Start: 2025-06-15

## 2025-06-27 RX ORDER — SERTRALINE HYDROCHLORIDE 100 MG/1
1 TABLET, FILM COATED ORAL
COMMUNITY
Start: 2025-06-15

## 2025-06-27 ASSESSMENT — ENCOUNTER SYMPTOMS
HALLUCINATIONS: 1
POLYDIPSIA: 1

## 2025-06-27 ASSESSMENT — PAIN SCALES - GENERAL: PAINLEVEL_OUTOF10: 0-NO PAIN

## 2025-06-27 NOTE — PROGRESS NOTES
"[facility]  Pediatric Diabetes Center    Subjective   Cherrie Moses is a 20 y.o. female with type 1 diabetes.   Today Cherrie presents to clinic with her mother.     HPI  Was running hyperglycemic, changed doses just last night based on her dad's advice; increased carb coverage from 4 to 3, increased ISF from 30 to 25.     Not bolusing for all carbs  Ate Mexican, had a pitcher of Alana, did not bolus  Not bolusing for fruit    Other Medical History:     Seesinai Pelayo - psychitrist  Sulema Rivero - therapist    Saw gyn - has IUD        Manages diabetes with Omnipod 5     Concerns at this visit:    Numbers have been running high  Ready to transition    Social:   Sees psychiatrist and counselor          Goals         in target a1c (pt-stated)       Pre meal bolus  Try to limit carbs              Diabetes  Date of Diabetes Diagnosis: 05/01/16  Type of Diabetes: Type 1    Insulin Delivery  Diabetes Management Regimen: Pump  Pump Type: Omnipod  Using AID System: Yes  Boluses Per Day (user initiated): 2.6  Total Daily Dose of Insulin (units): 60.4  Total Basal Insulin Per Day (units): 41.9  % Basal: 69.37  % Bolus: 30.63  Total Daily Carbs (grams): 40  Percent Automated Mode (%): 80    Glucose Monitoring  How do you primarily monitor blood sugars?: CGM  CGM Type: Dexcom G6  Time in range 70-180mg/dL (%): 28  Time low <70mg/dL (%): 0  Time high >180mg/dL (%): 72  Average Glucose: 249  Predicted GMI: 9.3              Education  Comprehensive Diabetes Education : 05/01/16    Screens  Flu Shot: Not applicable  Depression Screen: Not applicable  Counseling: Currently in counseling         Review of Systems   Endocrine: Positive for polydipsia and polyuria.   Psychiatric/Behavioral:  Positive for hallucinations.        Objective   /84   Pulse 80   Resp 20   Ht 1.72 m (5' 7.72\")   Wt 78.9 kg (174 lb 0.9 oz)   SpO2 100%   BMI 26.69 kg/m²      Physical Exam  Constitutional:       " Appearance: Normal appearance.   HENT:      Head: Normocephalic.      Nose: Nose normal.      Mouth/Throat:      Mouth: Mucous membranes are moist.   Neck:      Thyroid: No thyroid mass, thyromegaly or thyroid tenderness.   Cardiovascular:      Rate and Rhythm: Normal rate and regular rhythm.   Pulmonary:      Effort: Pulmonary effort is normal. No respiratory distress.   Abdominal:      General: Abdomen is flat.      Palpations: Abdomen is soft.   Skin:     General: Skin is warm.      Comments: No lipohypertrophy   Neurological:      General: No focal deficit present.      Mental Status: She is alert and oriented to person, place, and time.          Lab  Lab Results   Component Value Date    HGBA1C 9.4 (A) 06/27/2025    HGBA1C 7.6 (A) 06/28/2024    HGBA1C 9.7 (A) 02/09/2024    HGBA1C 10.1 (A) 05/13/2022       Assessment/Plan   Cherrie Moses is a 20 y.o. female with type 1 diabetes of 9 years duration, as well as major depressive disorder and psychosis. She also has a history of hydrocephalus and  shunt.   She is currently managed with Omnipod 5 and dexcom G6. She would like to upgrade to a G7. HbA1c has risen from 7.6% to 9.4%. Rise is HbA1c is due to lack of bolusing. She met with our RD. We discussed transition to adult endocrine; she would like to go to the  diabetes center on [facility]. She will follow up one more time in peds clinic if she cannot get an adult appt within 3 months. She is due for annual labs.       Plan:    Diagnoses and all orders for this visit:  Type 1 diabetes, HbA1c goal < 7% (Multi)  -     POCT glycosylated hemoglobin (Hb A1C) manually resulted; Standing  -     Dexcom G7 Sensor device; Change every 10 days as directed to monitor glucose  -     insulin lispro (HumaLOG U-100 Insulin) 100 unit/mL injection; 100 units daily  per insulin pump  -     Comprehensive Metabolic Panel; Future  -     Hemoglobin A1C; Future  -     Thyroid Stimulating Hormone; Future  -     Thyroxine,  Free; Future  -     Thyroid Peroxidase (TPO) Antibody; Future  -     Lipid Panel Non-Fasting; Future  -     Albumin-Creatinine Ratio, Urine Random; Future       Insulin Instructions  Omnipod 5   insulin lispro 100 unit/mL injection   Last edited by Mabel Wan DO on 6/27/2025 at 1:31 PM      Active insulin time = 2 hours      Basal Rate   Total Basal Dose: 36 units/day   Time units/hr   12:00 AM 1.5      Blood Glucose Target   Time mg/dL   12:00  - 110    8:00  - 110    9:00  - 110      Sensitivity Factor   Time mg/dL/unit   12:00 AM 25      Carb Ratio   Time g/unit   12:00 AM 4       CGM Interpretation/Plan   14 day CGM download was reviewed in detail as documented above under GLUCOSE MONITORING and will be attached to chart.  A minimum of 72 hours of glucose data was used to inform the management plan outlined above. She is 28% TIR and 0% low. She is missing many boluses leading to hyperglycemia. When she does bolus it is often an underbolus. I recommend to change her carb ratio back to 4 (she changed it yesterday to 3), which still may be too much if she counted all her carbs. I recommend to lower the target to 110 mg/dL.     Mabel Wan DO

## 2025-06-27 NOTE — PROGRESS NOTES
"Reason for Nutrition Visit:  Pt is a 20 y.o. female being seen for  T1DM; nutrition check in.    Past Medical Hx:  Problem List[1]     22 Diet Recall:  Meal 1: skip  Meal 2: skip. Sometimes [employer] breakfast. Yesterday had ice cream - did not count carbs for  Meal 3: meat+ vegetable + carb. Mexican restaurant - pitcher jil + fajita (meat+vegetables). Chips+ salsa (estimated 60g total)  Snacks: cucumbers, meat sticks, cheese sticks. Snack on fruit (grapes, tangerines)   Beverages: water,     Bolusing after - afraid of lows, not always sure how much she is going to eat. Reports waiting a bit after eating.     Allergies[2]    Anthropometrics:         6/27/2025    12:56 PM   Vitals   Systolic 130   Diastolic 84   Heart Rate 80   Resp 20   Height 1.72 m (5' 7.72\")   Weight (lb) 174.05   BMI 26.69 kg/m2   BSA (m2) 1.94 m2   Visit Report Report        Wt Readings from Last 4 Encounters:   06/27/25 78.9 kg (174 lb 0.9 oz)   04/18/25 81.6 kg (180 lb)   10/01/24 80.3 kg (177 lb)   06/28/24 79.8 kg (175 lb 14.8 oz)       Lab Results   Component Value Date    HGBA1C 9.4 (A) 06/27/2025    CHOL 152 05/13/2022      Results for orders placed or performed in visit on 06/27/25   POCT glycosylated hemoglobin (Hb A1C) manually resulted    Collection Time: 06/27/25  1:06 PM   Result Value Ref Range    POC HEMOGLOBIN A1c 9.4 (A) 4.2 - 6.5 %       Insulin Instructions  Omnipod 5   insulin lispro 100 unit/mL injection   Last edited by Mabel Wan DO on 6/27/2025 at 1:31 PM      Active insulin time = 2 hours      Basal Rate   Total Basal Dose: 36 units/day   Time units/hr   12:00 AM 1.5      Blood Glucose Target   Time mg/dL   12:00  - 110    8:00  - 110    9:00  - 110      Sensitivity Factor   Time mg/dL/unit   12:00 AM 25      Carb Ratio   Time g/unit   12:00 AM 4         Medications:   Medications Ordered Prior to Encounter[3]     Estimated Energy Needs: 9597-9331 kcal/day  IOM vs adult 25 " kcal/kg    Nutrition Diagnosis:    Diagnosis Statement 1:  Diagnosis Status: New  Diagnosis : Altered nutrition related lab values  related to T1DM, possible food and nutrition knowledge deficit as evidenced by elevated A1c, diet recall and need to carb count all foods, benefit from review of carb counting education due to concern for undercounting    Nutrition Intervention:  Met with Cherrie and mom in clinic today. Reviewed typical meal pattern and carb estimates. Cherrie reports she does look at nutrition labels. Notes she often boluses after due to concern for lows/unsure how much she will eat. Per discussion, may be bolusing quite a while after. Reviewed portions of foods consumed and together looked up the carb counts and nutrition labels to illustrate how many carbs are in the portion she consumes (notably in chips, fruit, beverages). Emphasized that foods that appear low carb can add up quickly and her ICR is 1:4g. Emphasized portion of beverages can also add up quickly in cups/refills she may have. Emphasized importance to bolus insulin with meals to help prevent blood sugar spikes.    Nutrition Goals:  Review all nutrition labels and portions of foods consumed. Even foods low carb add up when we eat larger portions. Goal to count all carbs  Goal to bolus insulin at meals or per team to better cover carb amounts       [1]   Patient Active Problem List  Diagnosis    Brainstem lesion    Brainstem neoplasm (Multi)    Cerebral cyst    Complex posttraumatic stress disorder    Feeling down    Hypoglycemia unawareness in type 1 diabetes mellitus    Irregular periods    Major depressive disorder, recurrent    Obstructive hydrocephalus (Multi)    Presence of cerebrospinal fluid drainage device    Primary amenorrhea    Seizures (Multi)    Subdural hematoma (Multi)    Suicidal ideations   [2] No Known Allergies  [3]   Current Outpatient Medications on File Prior to Visit   Medication Sig Dispense Refill    acetone,  urine, test (TRUEplus Ketone) strip test urine for ketones if blood sugar >250, with illness, or if insulin dose missed      alum-mag hydroxide-simeth (Antacid-Antigas) 200-200-20 mg/5 mL oral suspension combine with lidociane and diphenhydramine . swish and spit 15ml every 6 hours if needed for stomatitis 20 mL 0    ARIPiprazole (Abilify) 10 mg tablet Take 1 tablet (10 mg) by mouth. (Patient not taking: Reported on 6/27/2025)      clonazePAM (KlonoPIN) 1 mg disintegrating tablet Dissolve 1 tablet (1 mg) in the mouth if needed for seizures.  TAKE 1 TABLET Other PRN After seizure > than 5 minutes.      dexAMETHasone 0.5 mg/5 mL oral liquid Take 10 mL (1 mg) by mouth once daily for 7 days. 70 mL 0    Dexcom G6 Transmitter device Use as instructed 1 each 3    Dexcom G7 Sensor device Change every 10 days as directed to monitor glucose 3 each 11    diphenhydrAMINE 12.5 mg/5 mL liquid combine with  antacid and lidocaine and swish and spit with 15 ml every 6 hours if needed for stomatitis 20 mL 0    glucagon (Baqsimi) 3 mg/actuation spray,non-aerosol Spray into nose as directed for severe low blood sugar 2 each 2    guanFACINE (Intuniv) 1 mg ER 24 hr tablet Take 1 tablet (1 mg) by mouth early in the morning..      insulin glargine (Lantus Solostar U-100 Insulin) 100 unit/mL (3 mL) pen Inject 26 Units under the skin.  inject 26 units subcutaneoulsy daily as needed for pump failure      insulin lispro (HumaLOG U-100 Insulin) 100 unit/mL injection 100 units daily  per insulin pump 30 mL 11    insulin pump cart,automated,BT (Omnipod 5 G6 Pods, Gen 5,) cartridge Inject 1 Device under the skin every other day. 15 each 11    lamoTRIgine (LaMICtal) 100 mg tablet Take 1 tablet (100 mg) by mouth 2 times a day. (Patient not taking: Reported on 6/27/2025)      lamoTRIgine (LaMICtal) 25 mg tablet Take 1 tablet (25 mg) by mouth 3 times a day. (Patient not taking: Reported on 6/27/2025)      lidocaine (Xylocaine) 2 % solution combine  with diphenhydramine and antacid and swish and spit 15ml every 6 hours if needed for stomatitis 20 mL 0    lurasidone (Latuda) 60 mg tablet take one tablet by mouth once daily with dinner      norgestimate-ethinyl estradioL (Ortho-Cyclen) 0.25-35 mg-mcg tablet Take 1 tablet by mouth once daily. 28 tablet 12    Omnipod 5 G6-G7 Pods, Gen 5, cartridge change pods every 3 days to administer insulin 10 each 6    sertraline (Zoloft) 100 mg tablet Take 1 tablet (100 mg) by mouth early in the morning..      [DISCONTINUED] blood-glucose sensor (Dexcom G6 Sensor) device change sensor every 10 days for blood glucose monitoring 3 each 3    [DISCONTINUED] insulin lispro (HumaLOG U-100 Insulin) 100 unit/mL injection 100 units daily  per insulin pump 30 mL 11     No current facility-administered medications on file prior to visit.

## 2025-06-27 NOTE — PATIENT INSTRUCTIONS
Good to see you! You have higher glucoses because you are not bolusing very often for carbs.   1) We changed your carb ratio back to 4.   2) We lowered your target to 110 all day  3) You are due for annual labs  4) I prescribed the Dexcom G7. Make sure you keep your pod and dexcom on the same side of your body.     Today we discussed transitioning to an adult endocrine provider.  To schedule an adult endocrinology appointment with Cherrie Tsang DNP or Maribell London MD; 3 please call scheduling ([phone]) and ask for the next available appointment AND to be placed on the wait list for a sooner appointment (in case one opens up).  We recommend calling as soon as possible, since the next available appointment may be six months or more in the future.    In the meantime, we'll see you back here in peds endocrine clinic in 3 months if you are not able to get into adult endocrine within the next 3 months

## 2025-06-28 LAB
ALBUMIN SERPL-MCNC: 4.1 G/DL (ref 3.6–5.1)
ALBUMIN/CREAT UR: 132 MG/G CREAT
ALP SERPL-CCNC: 122 U/L (ref 31–125)
ALT SERPL-CCNC: 10 U/L (ref 6–29)
ANION GAP SERPL CALCULATED.4IONS-SCNC: 12 MMOL/L (CALC) (ref 7–17)
AST SERPL-CCNC: 9 U/L (ref 10–30)
BILIRUB SERPL-MCNC: 0.5 MG/DL (ref 0.2–1.2)
BUN SERPL-MCNC: 13 MG/DL (ref 7–25)
CALCIUM SERPL-MCNC: 9.1 MG/DL (ref 8.6–10.2)
CHLORIDE SERPL-SCNC: 100 MMOL/L (ref 98–110)
CHOLEST SERPL-MCNC: 155 MG/DL
CHOLEST/HDLC SERPL: 2.9 (CALC)
CO2 SERPL-SCNC: 22 MMOL/L (ref 20–32)
CREAT SERPL-MCNC: 0.66 MG/DL (ref 0.5–0.96)
CREAT UR-MCNC: 124 MG/DL (ref 20–275)
EGFRCR SERPLBLD CKD-EPI 2021: 127 ML/MIN/1.73M2
EST. AVERAGE GLUCOSE BLD GHB EST-MCNC: 232 MG/DL
EST. AVERAGE GLUCOSE BLD GHB EST-SCNC: 12.8 MMOL/L
GLUCOSE SERPL-MCNC: 385 MG/DL (ref 65–139)
HBA1C MFR BLD: 9.7 %
HDLC SERPL-MCNC: 54 MG/DL
LDLC SERPL CALC-MCNC: 84 MG/DL (CALC)
MICROALBUMIN UR-MCNC: 16.4 MG/DL
NONHDLC SERPL-MCNC: 101 MG/DL (CALC)
POTASSIUM SERPL-SCNC: 4.3 MMOL/L (ref 3.5–5.3)
PROT SERPL-MCNC: 7.3 G/DL (ref 6.1–8.1)
SODIUM SERPL-SCNC: 134 MMOL/L (ref 135–146)
T4 FREE SERPL-MCNC: 1.2 NG/DL (ref 0.8–1.8)
THYROPEROXIDASE AB SERPL-ACNC: NORMAL [IU]/ML
TRIGL SERPL-MCNC: 79 MG/DL
TSH SERPL-ACNC: 1.17 MIU/L

## 2025-07-01 LAB
ALBUMIN SERPL-MCNC: 4.1 G/DL (ref 3.6–5.1)
ALP SERPL-CCNC: 122 U/L (ref 31–125)
ALT SERPL-CCNC: 10 U/L (ref 6–29)
ANION GAP SERPL CALCULATED.4IONS-SCNC: 12 MMOL/L (CALC) (ref 7–17)
AST SERPL-CCNC: 9 U/L (ref 10–30)
BILIRUB SERPL-MCNC: 0.5 MG/DL (ref 0.2–1.2)
BUN SERPL-MCNC: 13 MG/DL (ref 7–25)
CALCIUM SERPL-MCNC: 9.1 MG/DL (ref 8.6–10.2)
CHLORIDE SERPL-SCNC: 100 MMOL/L (ref 98–110)
CHOLEST SERPL-MCNC: 155 MG/DL
CHOLEST/HDLC SERPL: 2.9 (CALC)
CO2 SERPL-SCNC: 22 MMOL/L (ref 20–32)
CREAT SERPL-MCNC: 0.66 MG/DL (ref 0.5–0.96)
EGFRCR SERPLBLD CKD-EPI 2021: 127 ML/MIN/1.73M2
EST. AVERAGE GLUCOSE BLD GHB EST-MCNC: 232 MG/DL
EST. AVERAGE GLUCOSE BLD GHB EST-SCNC: 12.8 MMOL/L
GLUCOSE SERPL-MCNC: 385 MG/DL (ref 65–139)
HBA1C MFR BLD: 9.7 %
HDLC SERPL-MCNC: 54 MG/DL
LDLC SERPL CALC-MCNC: 84 MG/DL (CALC)
NONHDLC SERPL-MCNC: 101 MG/DL (CALC)
POTASSIUM SERPL-SCNC: 4.3 MMOL/L (ref 3.5–5.3)
PROT SERPL-MCNC: 7.3 G/DL (ref 6.1–8.1)
SODIUM SERPL-SCNC: 134 MMOL/L (ref 135–146)
T4 FREE SERPL-MCNC: 1.2 NG/DL (ref 0.8–1.8)
THYROPEROXIDASE AB SERPL-ACNC: 1 IU/ML
TRIGL SERPL-MCNC: 79 MG/DL
TSH SERPL-ACNC: 1.17 MIU/L

## 2025-07-11 ENCOUNTER — APPOINTMENT (OUTPATIENT)
Dept: PEDIATRIC ENDOCRINOLOGY | Facility: CLINIC | Age: 20
End: 2025-07-11
Payer: COMMERCIAL

## 2025-08-20 DIAGNOSIS — E10.9 TYPE 1 DIABETES, HBA1C GOAL < 7% (MULTI): ICD-10-CM

## 2025-08-20 RX ORDER — INSULIN PMP CART,AUT,G6/7,CNTR
EACH SUBCUTANEOUS
Qty: 30 EACH | Refills: 3 | Status: SHIPPED | OUTPATIENT
Start: 2025-08-20

## 2025-09-23 ENCOUNTER — APPOINTMENT (OUTPATIENT)
Dept: PEDIATRIC ENDOCRINOLOGY | Facility: CLINIC | Age: 20
End: 2025-09-23
Payer: COMMERCIAL

## 2026-01-16 ENCOUNTER — APPOINTMENT (OUTPATIENT)
Dept: ENDOCRINOLOGY | Facility: CLINIC | Age: 21
End: 2026-01-16
Payer: MEDICAID